# Patient Record
Sex: MALE | Race: BLACK OR AFRICAN AMERICAN
[De-identification: names, ages, dates, MRNs, and addresses within clinical notes are randomized per-mention and may not be internally consistent; named-entity substitution may affect disease eponyms.]

---

## 2020-07-11 ENCOUNTER — HOSPITAL ENCOUNTER (INPATIENT)
Dept: HOSPITAL 74 - YASAS | Age: 50
LOS: 4 days | Discharge: HOME | DRG: 773 | End: 2020-07-15
Attending: ALLERGY & IMMUNOLOGY | Admitting: ALLERGY & IMMUNOLOGY
Payer: COMMERCIAL

## 2020-07-11 VITALS — BODY MASS INDEX: 21.7 KG/M2

## 2020-07-11 DIAGNOSIS — M21.42: ICD-10-CM

## 2020-07-11 DIAGNOSIS — Z59.0: ICD-10-CM

## 2020-07-11 DIAGNOSIS — Z90.49: ICD-10-CM

## 2020-07-11 DIAGNOSIS — F10.230: Primary | ICD-10-CM

## 2020-07-11 DIAGNOSIS — Z91.012: ICD-10-CM

## 2020-07-11 DIAGNOSIS — M21.41: ICD-10-CM

## 2020-07-11 DIAGNOSIS — Z91.018: ICD-10-CM

## 2020-07-11 DIAGNOSIS — F17.210: ICD-10-CM

## 2020-07-11 DIAGNOSIS — F11.23: ICD-10-CM

## 2020-07-11 DIAGNOSIS — F32.9: ICD-10-CM

## 2020-07-11 PROCEDURE — HZ2ZZZZ DETOXIFICATION SERVICES FOR SUBSTANCE ABUSE TREATMENT: ICD-10-PCS | Performed by: ALLERGY & IMMUNOLOGY

## 2020-07-11 PROCEDURE — U0003 INFECTIOUS AGENT DETECTION BY NUCLEIC ACID (DNA OR RNA); SEVERE ACUTE RESPIRATORY SYNDROME CORONAVIRUS 2 (SARS-COV-2) (CORONAVIRUS DISEASE [COVID-19]), AMPLIFIED PROBE TECHNIQUE, MAKING USE OF HIGH THROUGHPUT TECHNOLOGIES AS DESCRIBED BY CMS-2020-01-R: HCPCS

## 2020-07-11 RX ADMIN — Medication SCH MG: at 22:09

## 2020-07-11 SDOH — ECONOMIC STABILITY - HOUSING INSECURITY: HOMELESSNESS: Z59.0

## 2020-07-11 NOTE — BHS.RME
Substance Use & Tx History





- Substance Use History


  ** Alcohol


Substance amount: 1/2 pint


Frequency of use: Daily


Substance route: Oral


Date of Last Use: 07/10/20





  ** Heroin


Substance amount: 15 bags


Frequency of use: Daily


Substance route: Inhalation (ex: sniffing or snorting)


Date of Last Use: 07/10/20





- Last Treatment


Date of last treatment: 2020 at [facility]


Where was last treatment: Detox





Physical/Psych/Mental Status





- Behavior


General Behavior: Increased activity (restlessness, agitation)


Eye Contact: Normal


Other Behaviors: Mannerisms





- Cooperativeness


Cooperativeness: Cooperative





- Thinking


Thought Processes: Tight, Logical, Goal Directed


Thought content: Future oriented





- Physical Health Problems


Is patient presently having any pain?: Yes


Does patient presently have any injuries (include location): No


Does patient currently have a fever: No





COWS





- Scale


Resting Pulse: 0= [place] 80 or Below


Sweatin= Chills/Flushing


Restless Observation: 1= Difficult to Sit Still


Pupil Size: 1= Pupils >than Normal


Bone or Joint Aches: 2= Severe Diffuse Aches


Runny Nose/ Eye Tearin= Runny Nose/Eyes


GI Upset > 30mins: 1= Stomach Cramp


Tremor Observation: 2= Slight Tremor Visible


Yawning Observation: 1= 1-2x During Session


Anxiety or Irritability: 0= None


Goose Flesh Skin: 3=Piloerection


COWS Score: 14





CIWA


Nausea/Vomitin-Mild Nausea/No Vomiting


Muscle Tremors: 2


Anxiety: 2


Agitation: 2


Paroxysmal Sweats: 2


Orientation: 0-Oriented


Tacttile Disturbances: 1-Very Mild Itch/Numbness


Auditory Disturbances: 0-None


Visual Disturbances: 0-None


Headache: 1-Very Mild


CIWA-Ar Total Score: 11





Treatment Recommendation





- Level of Care


Level of Care: Opioid Treatment Program (OTP)

## 2020-07-11 NOTE — HP
COWS





- Scale


Resting Pulse: 0= [place] 80 or Below


Sweatin= Chills/Flushing


Restless Observation: 1= Difficult to Sit Still


Pupil Size: 1= Pupils >than Normal


Bone or Joint Aches: 2= Severe Diffuse Aches


Runny Nose/ Eye Tearin= Runny Nose/Eyes


GI Upset > 30mins: 1= Stomach Cramp


Tremor Observation: 2= Slight Tremor Visible


Yawning Observation: 1= 1-2x During Session


Anxiety or Irritability: 0= None


Goose Flesh Skin: 3=Piloerection


COWS Score: 14





CIWA Score


Nausea/Vomitin-Mild Nausea/No Vomiting


Muscle Tremors: 2


Anxiety: 2


Agitation: 2


Paroxysmal Sweats: 2


Orientation: 0-Oriented


Tacttile Disturbances: 1-Very Mild Itch/Numbness


Auditory Disturbances: 0-None


Visual Disturbances: 0-None


Headache: 1-Very Mild


CIWA-Ar Total Score: 11





- Admission Criteria


[facility] Guidelines: Admission for Medically Managed Detox: 


Requires at least one of the followin. CIWA greater than 12


2. Seizures within the past 24 hours


3. Delirium tremens within the past 24 hours


4. Hallucinations within the past 24 hours


5. Acute intervention needed for co  occurring medical disorder


6. Acute intervention needed for co  occurring psychiatric disorder


7. Severe withdrawal that cannot be handled at a lower level of care (continued


    vomiting, continued diarrhea, abnormal vital signs) requiring intravenous


    medication and/or fluids


8. Pregnancy








Admission ROS [facility]


Chief Complaint: 





I got heroin and alcohol problems, I came to get help


Allergies/Adverse Reactions: 


                                    Allergies











Allergy/AdvReac Type Severity Reaction Status Date / Time


 


egg Allergy Intermediate Itching Verified 20 15:05


 


mustard Allergy Intermediate Itching Verified 20 15:05


 


Pork/Porcine Containing Allergy Mild  Verified 20 15:05





Products     











History of Present Illness: 


50 year old man with alcohol and heroin dependence presents for detox. His last 

treatment at [facility] was in , he had treatment at [facility] in  of this 

year. He denies seizures, blackouts and overdose.





- Ebola screening


Have you traveled outside of the country in the last 21 days: No


Have you had contact with anyone from an Ebola affected area: No


Have you been sick,other than usual withdrawal symptoms: No


Do you have a fever: No





- Review of Systems


Constitutional: Loss of Appetite, Changes in sleep, Unintentional Wgt. Loss


EENT: reports: No Symptoms Reported, Nose Congestion


Respiratory: reports: No Symptoms reported


Cardiac: reports: No Symptoms Reported


GI: reports: Nausea, Poor Appetite, Abdominal cramping


: reports: No Symptoms Reported


Musculoskeletal: reports: Muscle Pain, Muscle Weakness


Integumentary: reports: Sweating


Neuro: reports: Numbness, Tremors


Endocrine: reports: No Symptoms Reported


Hematology: reports: No Symptoms Reported


Psychiatric: reports: Depressed


Other Systems: Reviewed and Negative





Patient History





- Patient Medical History


Hx Anemia: No


Hx Asthma: No


Hx Chronic Obstructive Pulmonary Disease (COPD): No


Hx Cancer: No


Hx Cardiac Disorders: No


Hx Congestive Heart Failure: No


Hx Hypertension: No


Hx Hypercholesterolemia: No


Hx Pacemaker: No


HX Cerebrovascular Accident: No


Hx Seizures: No


Hx Dementia: No


Hx Diabetes: No


Hx Gastrointestinal Disorders: No


Hx Liver Disease: No


Hx Genitourinary Disorders: No


Hx Sexually Transmitted Disorders: No


Hx Renal Disease (ESRD): No


Hx Thyroid Disease: No


Hx Human Immunodeficiency Virus (HIV): No


Hx Hepatitis C: No


Hx Depression: Yes


Hx Suicide Attempt: No


Hx Bipolar Disorder: No


Hx Schizophrenia: No





- Patient Surgical History


Past Surgical History: Yes


Hx Neurologic Surgery: No


Hx Cataract Extraction: No


Hx Cardiac Surgery: No


Hx Lung Surgery: No


Hx Breast Surgery: No


Hx Breast Biopsy: No


Hx Abdominal Surgery: No


Hx Appendectomy: Yes (15 years ago)


Hx Cholecystectomy: No


Hx Genitourinary Surgery: No


Hx Orthopedic Surgery: No


Hx Hysterectomy: No


Anesthesia Reaction: No





- PPD History


Previous Implant?: Yes


Documented Results: Negative w/proof


Implanted On Prior [facility] Admission?: No


PPD to be Administered?: Yes





- Smoking Cessation


Smoking history: Current every day smoker


Have you smoked in the past 12 months: Yes


Aproximately how many cigarettes per day: 10


Hx Chewing Tobacco Use: No


Initiated information on smoking cessation: Yes


'Breaking Loose' booklet given: 20





Admission Physical Exam B[facility]





- Physical


General Appearance: Yes: No Apparent Distress


HEENTM: Yes: EOMI, Hearing grossly Normal, Normal ENT Inspection, Normocephalic,

Normal Voice


Respiratory: Yes: Chest Non-Tender, Lungs Clear, Normal Breath Sounds, No 

Respiratory Distress, No Accessory Muscle Use


Neck: Yes: No masses,lesions,Nodules, Supple


Breast: Yes: Breast Exam Deferred


Cardiology: Yes: Regular Rhythm, Regular Rate, S1, S2


Abdominal: Yes: Normal Bowel Sounds, Non Tender, Soft


Genitourinary: Yes: Within Normal Limits


Back: Yes: Normal Inspection


Musculoskeletal: Yes: full range of Motion, Muscle Pain, Muscle weakness


Extremities: Yes: Normal Range of Motion, Non-Tender, Tremors


Neurological: Yes: CNs II-XII NML intact, Fully Oriented, Alert, Normal 

Mood/Affect, Normal Response, Numbness


Integumentary: Yes: Cold


Lymphatic: Yes: Within Normal Limits





- Diagnostic


(1) Alcohol dependence, uncomplicated


Current Visit: Yes   Status: Acute   





(2) Heroin addiction


Current Visit: Yes   Status: Chronic   





(3) Nicotine addiction


Current Visit: Yes   Status: Chronic   


Qualifiers: 


   Nicotine product type: cigarettes   Substance use status: uncomplicated   

Qualified Code(s): F17.210 - Nicotine dependence, cigarettes, uncomplicated   





Cleared for Admission S





- Detox or Rehab


[facility] Level of Care: Medically Managed


Detox Regimen/Protocol: Methadone/Librium


Claeared for Rehab Admission: No





Breathalyzer





- Breathalyzer


Breathalyzer: 0





Urine Drug Screen





- Test Device


Lot number: [id number]


Expiration date: 21





- Control


Is test valid?: Yes





- Results


Drug screen NEGATIVE: No


Urine drug screen results: FEN-Fentanyl, MOP-Opiates, MTD-Methadone





Inpatient Rehab Admission





- Rehab Decision to Admit


Inpatient rehab admission?: No

## 2020-07-12 LAB
ALBUMIN SERPL-MCNC: 3.3 G/DL (ref 3.4–5)
ALP SERPL-CCNC: 84 U/L (ref 45–117)
ALT SERPL-CCNC: 22 U/L (ref 13–61)
ANION GAP SERPL CALC-SCNC: 6 MMOL/L (ref 8–16)
AST SERPL-CCNC: 13 U/L (ref 15–37)
BILIRUB SERPL-MCNC: 0.8 MG/DL (ref 0.2–1)
BUN SERPL-MCNC: 9.8 MG/DL (ref 7–18)
CALCIUM SERPL-MCNC: 9 MG/DL (ref 8.5–10.1)
CHLORIDE SERPL-SCNC: 105 MMOL/L (ref 98–107)
CO2 SERPL-SCNC: 29 MMOL/L (ref 21–32)
CREAT SERPL-MCNC: 1 MG/DL (ref 0.55–1.3)
DEPRECATED RDW RBC AUTO: 13.5 % (ref 11.9–15.9)
GLUCOSE SERPL-MCNC: 86 MG/DL (ref 74–106)
HCT VFR BLD CALC: 42.4 % (ref 35.4–49)
HGB BLD-MCNC: 13.8 GM/DL (ref 11.7–16.9)
MCH RBC QN AUTO: 28 PG (ref 25.7–33.7)
MCHC RBC AUTO-ENTMCNC: 32.6 G/DL (ref 32–35.9)
MCV RBC: 85.8 FL (ref 80–96)
PLATELET # BLD AUTO: 247 K/MM3 (ref 134–434)
PMV BLD: 9.5 FL (ref 7.5–11.1)
POTASSIUM SERPLBLD-SCNC: 4.4 MMOL/L (ref 3.5–5.1)
PROT SERPL-MCNC: 6.5 G/DL (ref 6.4–8.2)
RBC # BLD AUTO: 4.94 M/MM3 (ref 4–5.6)
SODIUM SERPL-SCNC: 140 MMOL/L (ref 136–145)
WBC # BLD AUTO: 8 K/MM3 (ref 4–10)

## 2020-07-12 RX ADMIN — Medication SCH MG: at 22:06

## 2020-07-12 RX ADMIN — NICOTINE SCH: 7 PATCH TRANSDERMAL at 10:30

## 2020-07-12 RX ADMIN — Medication SCH TAB: at 10:31

## 2020-07-12 NOTE — PN
S CIWA





- CIWA Score


Nausea/Vomitin-Mild Nausea/No Vomiting


Muscle Tremors: 2


Anxiety: 2


Agitation: 1-Slight > Activity


Paroxysmal Sweats: 1-Minimal Palms Moist


Orientation: 0-Oriented


Tacttile Disturbances: 0-None


Auditory Disturbances: 0-None


Visual Disturbances: 2-Mild Sensitivity


Headache: 1-Very Mild


CIWA-Ar Total Score: 10





BHS COWS





- Scale


Resting Pulse: 0= [place] 80 or Below


Sweatin= Chills/Flushing


Restless Observation: 0= Sits Still


Pupil Size: 1= Pupils >than Normal


Bone or Joint Aches: 1= Mild Discomfort


Runny Nose/ Eye Tearin= Nasal Congestion


GI Upset > 30mins: 2= Nausea/Diarrhea


Tremor Observation of Outstretched Hands: 2= Slight Tremor Visible


Yawning Observation: 1= 1-2x During Session


Anxiety or Irritability: 1=Feels Anxious/Irritable


Goose Flesh Skin: 0=Smooth Skin


COWS Score: 10





BHS Progress Note (SOAP)


Subjective: 





50 years old male admitted on 20 for alcohol and opiate withdrawal sx 

management treating with librium and methadone detox regiment


ate breakfast and lunch in room 


requests own shoes due to flat feet


encourage inspect the shoes before hand them to the patient


Objective: 





20 14:02


                               Vital Signs - 24 hr











  20





  15:07 15:36 16:00


 


Temperature 97.4 F L  98.4 F


 


Pulse Rate 74  64


 


Respiratory 20  18





Rate   


 


Blood Pressure 130/81  129/86


 


O2 Sat by Pulse  98 95





Oximetry (%)   














  20





  21:05 06:00 06:56


 


Temperature 98.2 F  97.7 F


 


Pulse Rate 60  64


 


Respiratory 16  18





Rate   


 


Blood Pressure 121/71  146/93


 


O2 Sat by Pulse 96 98 





Oximetry (%)   














  20





  09:20 12:35 13:05


 


Temperature 98.0 F 98.2 F 


 


Pulse Rate 87 65 


 


Respiratory 20 18 





Rate   


 


Blood Pressure 134/85 128/94 


 


O2 Sat by Pulse   95





Oximetry (%)   








                                Laboratory Tests











  20





  07:00 07:00 07:00


 


WBC   8.0 


 


RBC   4.94 


 


Hgb   13.8 


 


Hct   42.4 


 


MCV   85.8 


 


MCH   28.0 


 


MCHC   32.6 


 


RDW   13.5 


 


Plt Count   247 


 


MPV   9.5 


 


Sodium    140


 


Potassium    4.4


 


Chloride    105


 


Carbon Dioxide    29


 


Anion Gap    6 L


 


BUN    9.8


 


Creatinine    1.0


 


Est GFR (CKD-EPI)AfAm    101.98


 


Est GFR (CKD-EPI)NonAf    87.99


 


Random Glucose    86


 


Calcium    9.0


 


Total Bilirubin    0.8


 


AST    13 L


 


ALT    22


 


Alkaline Phosphatase    84


 


Total Protein    6.5


 


Albumin    3.3 L


 


Syphilis Serology  Non-reactive  











20 14:02


covid pending


Assessment: 





20 14:03


alcohol and opiate withdrawal 


Plan: 





librium and methadone regiments

## 2020-07-12 NOTE — EKG
Test Reason : 

Blood Pressure : ***/*** mmHG

Vent. Rate : 060 BPM     Atrial Rate : 060 BPM

   P-R Int : 204 ms          QRS Dur : 092 ms

    QT Int : 396 ms       P-R-T Axes : 048 088 060 degrees

   QTc Int : 396 ms

 

NORMAL SINUS RHYTHM

NORMAL ECG

NO PREVIOUS ECGS AVAILABLE

Confirmed by KEVIN GALLOWAY MD ([id number]) on 7/12/2020 11:29:29 AM

 

Referred By:             Confirmed By:KEVIN GALLOWAY MD

## 2020-07-13 RX ADMIN — Medication SCH MG: at 21:39

## 2020-07-13 RX ADMIN — Medication SCH TAB: at 10:14

## 2020-07-13 RX ADMIN — NICOTINE SCH: 7 PATCH TRANSDERMAL at 10:15

## 2020-07-13 NOTE — PN
S CIWA





- CIWA Score


Nausea/Vomitin


Muscle Tremors: 2


Anxiety: 2


Agitation: 2


Paroxysmal Sweats: No Perspiration


Orientation: 0-Oriented


Tacttile Disturbances: 1-Very Mild Itch/Numbness


Auditory Disturbances: 0-None


Visual Disturbances: 0-None


Headache: 2-Mild


CIWA-Ar Total Score: 11





BHS COWS





- Scale


Resting Pulse: 0= [place] 80 or Below


Sweatin= No chills or Flushing


Restless Observation: 0= Sits Still


Pupil Size: 0= Normal to Room Light


Bone or Joint Aches: 1= Mild Discomfort


Runny Nose/ Eye Tearin= Runny Nose/Eyes


GI Upset > 30mins: 2= Nausea/Diarrhea


Tremor Observation of Outstretched Hands: 2= Slight Tremor Visible


Yawning Observation: 0= None


Anxiety or Irritability: 2=Irritable/Anxious


Goose Flesh Skin: 0=Smooth Skin


COWS Score: 9





BHS Progress Note (SOAP)


Subjective: 





alert,irritable,anxious,interrupted sleep,pain in the body and back,tremor


Objective: 





20 11:15


                                   Vital Signs











Temperature  97.8 F   20 09:17


 


Pulse Rate  61   20 09:17


 


Respiratory Rate  18   20 09:17


 


Blood Pressure  105/56 L  20 09:17


 


O2 Sat by Pulse Oximetry (%)  98   20 09:17











20 11:15


                             Laboratory Last Values











WBC  8.0 K/mm3 (4.0-10.0)   20  07:00    


 


RBC  4.94 M/mm3 (4.00-5.60)   20  07:00    


 


Hgb  13.8 GM/dL (11.7-16.9)   20  07:00    


 


Hct  42.4 % (35.4-49)   20  07:00    


 


MCV  85.8 fl (80-96)   20  07:00    


 


MCH  28.0 pg (25.7-33.7)   20  07:00    


 


MCHC  32.6 g/dl (32.0-35.9)   20  07:00    


 


RDW  13.5 % (11.9-15.9)   20  07:00    


 


Plt Count  247 K/MM3 (134-434)   20  07:00    


 


MPV  9.5 fl (7.5-11.1)   20  07:00    


 


Sodium  140 mmol/L (136-145)   20  07:00    


 


Potassium  4.4 mmol/L (3.5-5.1)   20  07:00    


 


Chloride  105 mmol/L ()   20  07:00    


 


Carbon Dioxide  29 mmol/L (21-32)   20  07:00    


 


Anion Gap  6 MMOL/L (8-16)  L  20  07:00    


 


BUN  9.8 mg/dL (7-18)   20  07:00    


 


Creatinine  1.0 mg/dL (0.55-1.3)   20  07:00    


 


Est GFR (CKD-EPI)AfAm  101.98   20  07:00    


 


Est GFR (CKD-EPI)NonAf  87.99   20  07:00    


 


Random Glucose  86 mg/dL ()   20  07:00    


 


Calcium  9.0 mg/dL (8.5-10.1)   20  07:00    


 


Total Bilirubin  0.8 mg/dL (0.2-1)   20  07:00    


 


AST  13 U/L (15-37)  L  20  07:00    


 


ALT  22 U/L (13-61)   20  07:00    


 


Alkaline Phosphatase  84 U/L ()   20  07:00    


 


Total Protein  6.5 g/dl (6.4-8.2)   20  07:00    


 


Albumin  3.3 g/dl (3.4-5.0)  L  20  07:00    


 


Syphilis Serology  Non-reactive  (NONREACTIVE)   20  07:00    











Assessment: 





20 11:16


withdrawal symptom


Plan: 





continue detox methadone and librium regimen

## 2020-07-14 RX ADMIN — NICOTINE SCH: 7 PATCH TRANSDERMAL at 10:22

## 2020-07-14 RX ADMIN — Medication SCH MG: at 21:23

## 2020-07-14 RX ADMIN — Medication SCH TAB: at 10:22

## 2020-07-14 NOTE — PN
[facility] CIWA





- CIWA Score


Nausea/Vomitin-No Nausea/No Vomiting


Muscle Tremors: 3


Anxiety: 4-Mod. Anxious/Guarded


Agitation: 3


Paroxysmal Sweats: 1-Minimal Palms Moist


Orientation: 0-Oriented


Tacttile Disturbances: 0-None


Auditory Disturbances: 0-None


Visual Disturbances: 0-None


Headache: 0-None Present


CIWA-Ar Total Score: 11





BHS COWS





- Scale


Resting Pulse: 0= [place] 80 or Below


Sweatin= Chills/Flushing


Restless Observation: 0= Sits Still


Pupil Size: 0= Normal to Room Light


Bone or Joint Aches: 4=Acute Joint/Muscle Pain


Runny Nose/ Eye Tearin= None


GI Upset > 30mins: 0= None


Tremor Observation of Outstretched Hands: 1= Tremor Felt, Not Seen


Yawning Observation: 0= None


Anxiety or Irritability: 2=Irritable/Anxious


Goose Flesh Skin: 0=Smooth Skin


COWS Score: 8





BHS Progress Note (SOAP)


Subjective: 





Pt laying in bed in prone position c/o 


anxiety


"dizziness"


decreased appetite


lower Back pain


Objective: 





20 12:19


                               Vital Signs - 24 hr











  20





  12:48 17:32 20:15


 


Temperature 97.1 F L 97.5 F L 97.8 F


 


Pulse Rate 68 63 66


 


Respiratory 18 18 28 H





Rate   


 


Blood Pressure 104/60 120/92 130/84


 


O2 Sat by Pulse 95  





Oximetry (%)   














  20





  21:56 06:15 09:21


 


Temperature  97.5 F L 97.8 F


 


Pulse Rate  67 62


 


Respiratory  28 H 18





Rate   


 


Blood Pressure  133/95 112/72


 


O2 Sat by Pulse 97 98 95





Oximetry (%)   








                                Laboratory Tests











  20





  14:55 07:00 07:00


 


WBC    8.0


 


RBC    4.94


 


Hgb    13.8


 


Hct    42.4


 


MCV    85.8


 


MCH    28.0


 


MCHC    32.6


 


RDW    13.5


 


Plt Count    247


 


MPV    9.5


 


Sodium   


 


Potassium   


 


Chloride   


 


Carbon Dioxide   


 


Anion Gap   


 


BUN   


 


Creatinine   


 


Est GFR (CKD-EPI)AfAm   


 


Est GFR (CKD-EPI)NonAf   


 


Random Glucose   


 


Calcium   


 


Total Bilirubin   


 


AST   


 


ALT   


 


Alkaline Phosphatase   


 


Total Protein   


 


Albumin   


 


Syphilis Serology   Non-reactive 


 


COVID-19 ([facility])  Not detected  














  20





  07:00


 


WBC 


 


RBC 


 


Hgb 


 


Hct 


 


MCV 


 


MCH 


 


MCHC 


 


RDW 


 


Plt Count 


 


MPV 


 


Sodium  140


 


Potassium  4.4


 


Chloride  105


 


Carbon Dioxide  29


 


Anion Gap  6 L


 


BUN  9.8


 


Creatinine  1.0


 


Est GFR (CKD-EPI)AfAm  101.98


 


Est GFR (CKD-EPI)NonAf  87.99


 


Random Glucose  86


 


Calcium  9.0


 


Total Bilirubin  0.8


 


AST  13 L


 


ALT  22


 


Alkaline Phosphatase  84


 


Total Protein  6.5


 


Albumin  3.3 L


 


Syphilis Serology 


 


COVID-19 ([facility]) 








alert o x 3


nad


in bed but communicates needs coherently


Assessment: 





20 12:20


withdrawal sx


Plan: 





cont detox- meth/sarah


Robaxin prn for pain


ibuprofen as needed.


encouraged to increase po fluids


maintain safety

## 2020-07-15 VITALS — HEART RATE: 53 BPM | SYSTOLIC BLOOD PRESSURE: 124 MMHG | TEMPERATURE: 97.5 F | DIASTOLIC BLOOD PRESSURE: 81 MMHG

## 2020-07-15 NOTE — DS
[facility] Detox Discharge Summary


Admission Date: 


07/11/20





Discharge Date: 07/15/20





- History


Present History: Alcohol Dependence, Opioid Dependence


Pertinent Past History: 





denies PMH


Hx Depression(no treatment)





- Physical Exam Results


Vital Signs: 


                                   Vital Signs











Temperature  97.5 F L  07/15/20 06:08


 


Pulse Rate  53 L  07/15/20 06:08


 


Respiratory Rate  18   07/15/20 06:08


 


Blood Pressure  124/81   07/15/20 06:08


 


O2 Sat by Pulse Oximetry (%)  97   07/15/20 06:08








alert o x 3


nad


oob ambulating with steady gait


cardiac:s1 s2,rrr


lungs:ctab


extremities;no edema,skin intact


Pertinent Admission Physical Exam Findings: 


                                Laboratory Tests











  07/11/20 07/12/20 07/12/20





  14:55 07:00 07:00


 


WBC    8.0


 


RBC    4.94


 


Hgb    13.8


 


Hct    42.4


 


MCV    85.8


 


MCH    28.0


 


MCHC    32.6


 


RDW    13.5


 


Plt Count    247


 


MPV    9.5


 


Sodium   


 


Potassium   


 


Chloride   


 


Carbon Dioxide   


 


Anion Gap   


 


BUN   


 


Creatinine   


 


Est GFR (CKD-EPI)AfAm   


 


Est GFR (CKD-EPI)NonAf   


 


Random Glucose   


 


Calcium   


 


Total Bilirubin   


 


AST   


 


ALT   


 


Alkaline Phosphatase   


 


Total Protein   


 


Albumin   


 


Syphilis Serology   Non-reactive 


 


COVID-19 ([facility])  Not detected  














  07/12/20





  07:00


 


WBC 


 


RBC 


 


Hgb 


 


Hct 


 


MCV 


 


MCH 


 


MCHC 


 


RDW 


 


Plt Count 


 


MPV 


 


Sodium  140


 


Potassium  4.4


 


Chloride  105


 


Carbon Dioxide  29


 


Anion Gap  6 L


 


BUN  9.8


 


Creatinine  1.0


 


Est GFR (CKD-EPI)AfAm  101.98


 


Est GFR (CKD-EPI)NonAf  87.99


 


Random Glucose  86


 


Calcium  9.0


 


Total Bilirubin  0.8


 


AST  13 L


 


ALT  22


 


Alkaline Phosphatase  84


 


Total Protein  6.5


 


Albumin  3.3 L


 


Syphilis Serology 


 


COVID-19 ([facility]) 























- Treatment


Hospital Course: Detox Protocol Followed, Detoxed Safely, Responded well, 

Discharged Condition Good, Rehab Referral Accepted





- Medication


Discharge Medications: 


Ambulatory Orders





NK [No Known Home Medication]  07/11/20 











- Diagnosis


(1) Alcohol dependence, uncomplicated


Status: Acute   





(2) Nicotine addiction


Status: Acute   


Qualifiers: 


   Nicotine product type: cigarettes   Substance use status: in withdrawal   

Qualified Code(s): F17.213 - Nicotine dependence, cigarettes, with withdrawal   





(3) Opioid dependence


Status: Acute   


Qualifiers: 


   Substance use status: in withdrawal   Qualified Code(s): F11.23 - Opioid 

dependence with withdrawal   





- AMA


Did Patient Leave Against Medical Advice: No